# Patient Record
Sex: MALE | Race: WHITE | NOT HISPANIC OR LATINO | Employment: FULL TIME | URBAN - METROPOLITAN AREA
[De-identification: names, ages, dates, MRNs, and addresses within clinical notes are randomized per-mention and may not be internally consistent; named-entity substitution may affect disease eponyms.]

---

## 2019-09-17 ENCOUNTER — OFFICE VISIT (OUTPATIENT)
Dept: URGENT CARE | Facility: PHYSICIAN GROUP | Age: 24
End: 2019-09-17

## 2019-09-17 VITALS
HEART RATE: 81 BPM | OXYGEN SATURATION: 97 % | TEMPERATURE: 99.1 F | WEIGHT: 135 LBS | RESPIRATION RATE: 20 BRPM | SYSTOLIC BLOOD PRESSURE: 114 MMHG | DIASTOLIC BLOOD PRESSURE: 78 MMHG

## 2019-09-17 DIAGNOSIS — L08.9 SOFT TISSUE INFECTION: ICD-10-CM

## 2019-09-17 PROCEDURE — 99203 OFFICE O/P NEW LOW 30 MIN: CPT | Performed by: FAMILY MEDICINE

## 2019-09-17 RX ORDER — SULFAMETHOXAZOLE AND TRIMETHOPRIM 800; 160 MG/1; MG/1
1 TABLET ORAL EVERY 12 HOURS
Qty: 14 TAB | Refills: 0 | Status: SHIPPED | OUTPATIENT
Start: 2019-09-17 | End: 2019-09-24

## 2019-09-17 RX ORDER — AMOXICILLIN AND CLAVULANATE POTASSIUM 875; 125 MG/1; MG/1
1 TABLET, FILM COATED ORAL 2 TIMES DAILY
Qty: 14 TAB | Refills: 0 | Status: SHIPPED | OUTPATIENT
Start: 2019-09-17 | End: 2019-09-24

## 2019-09-18 NOTE — PROGRESS NOTES
Subjective:      Booker Mascorro is a 24 y.o. male who presents with Facial Swelling (left side of face swollen started yesterday morning getting bigger)            Onset this morning facial swelling, pain, redness, and warmth.  No drainage.  Pain 6/10 with direct contact.  Progressively worse.  No itching  No dental pain. No trismus.   No fever/chills.   No vesicles  No eye involvement  Minimal relief with OTC medications.  No other aggravating or alleviating factors.      Review of Systems   Constitutional: Negative for malaise/fatigue and weight loss.   Eyes: Negative for discharge and redness.   Gastrointestinal: Negative for nausea and vomiting.   Musculoskeletal: Negative for joint pain and myalgias.   Skin: Negative for itching and rash.   .  Medications, Allergies, and current problem list reviewed today in Epic         Objective:     /78 (BP Location: Right arm, Patient Position: Sitting, BP Cuff Size: Adult)   Pulse 81   Temp 37.3 °C (99.1 °F) (Temporal)   Resp 20   Wt 61.2 kg (135 lb)   SpO2 97%      Physical Exam   Constitutional: He is oriented to person, place, and time. He appears well-developed and well-nourished. No distress.   HENT:   Head: Normocephalic and atraumatic.       Right Ear: External ear normal.   Left Ear: External ear normal.   Eyes: Pupils are equal, round, and reactive to light. Conjunctivae and EOM are normal.   Cardiovascular: Normal rate, regular rhythm and normal heart sounds.   No murmur heard.  Pulmonary/Chest: Effort normal and breath sounds normal. He has no wheezes.   Neurological: He is alert and oriented to person, place, and time.   Skin: Skin is warm and dry. No rash noted.               Assessment/Plan:     1. Soft tissue infection  amoxicillin-clavulanate (AUGMENTIN) 875-125 MG Tab    sulfamethoxazole-trimethoprim (BACTRIM DS) 800-160 MG tablet    DISCONTINUED: cefTRIAXone (ROCEPHIN) 1 g, lidocaine (XYLOCAINE) 1 % 3.6 mL for IM use     Differential  diagnosis, natural history, supportive care, and indications for immediate follow-up discussed at length.     Nothing to I&D at this point.  Recommend returning to clinic with obvious abscess formation.

## 2019-09-19 ENCOUNTER — OFFICE VISIT (OUTPATIENT)
Dept: URGENT CARE | Facility: PHYSICIAN GROUP | Age: 24
End: 2019-09-19

## 2019-09-19 VITALS
RESPIRATION RATE: 16 BRPM | SYSTOLIC BLOOD PRESSURE: 114 MMHG | OXYGEN SATURATION: 95 % | DIASTOLIC BLOOD PRESSURE: 82 MMHG | HEIGHT: 70 IN | HEART RATE: 133 BPM | TEMPERATURE: 98.7 F | BODY MASS INDEX: 19.33 KG/M2 | WEIGHT: 135 LBS

## 2019-09-19 DIAGNOSIS — L02.91 ABSCESS: ICD-10-CM

## 2019-09-19 PROCEDURE — 99213 OFFICE O/P EST LOW 20 MIN: CPT | Performed by: FAMILY MEDICINE

## 2019-09-19 ASSESSMENT — ENCOUNTER SYMPTOMS
FACIAL SWELLING: 1
CHILLS: 0
NUMBNESS: 0
FEVER: 0

## 2019-09-19 NOTE — PROGRESS NOTES
"Subjective:   Booker Mascorro is a 24 y.o. male who presents for Facial Swelling (Left side of face, was seen tuesday 09/17/19, given antibiotics, pt states his condition is worsening)     Facial Swelling   This is a new problem. The current episode started in the past 7 days. The problem occurs constantly. The problem has been gradually improving. Pertinent negatives include no chills, fever or numbness.     Review of Systems   Constitutional: Negative for chills and fever.   HENT: Positive for facial swelling.    Neurological: Negative for numbness.      Objective:   /82 (BP Location: Left arm, Patient Position: Sitting, BP Cuff Size: Adult)   Pulse (!) 133   Temp 37.1 °C (98.7 °F) (Temporal)   Resp 16   Ht 1.778 m (5' 10\")   Wt 61.2 kg (135 lb)   SpO2 95%   BMI 19.37 kg/m²   Physical Exam   Constitutional: He is oriented to person, place, and time. He appears well-developed and well-nourished. No distress.   HENT:   Head: Normocephalic and atraumatic.       Eyes: Pupils are equal, round, and reactive to light. Conjunctivae and EOM are normal.   Cardiovascular: Normal rate and regular rhythm.   No murmur heard.  Pulmonary/Chest: Effort normal and breath sounds normal. No respiratory distress.   Abdominal: Soft. He exhibits no distension. There is no tenderness.   Neurological: He is alert and oriented to person, place, and time. He has normal reflexes. No sensory deficit.   Skin: Skin is warm and dry.   Psychiatric: He has a normal mood and affect.        Assessment/Plan:   1. Abscess  Continue current antibiotics. Use over-the-counter pain reliever, such as acetaminophen (Tylenol), ibuprofen (Advil, Motrin) or naproxen (Aleve) as needed; follow package directions for dosing.   Differential diagnosis, natural history, supportive care, and indications for immediate follow-up discussed.    "

## 2019-09-25 ASSESSMENT — ENCOUNTER SYMPTOMS
NAUSEA: 0
EYE DISCHARGE: 0
EYE REDNESS: 0
MYALGIAS: 0
WEIGHT LOSS: 0
VOMITING: 0

## 2020-07-23 ENCOUNTER — OFFICE VISIT (OUTPATIENT)
Dept: URGENT CARE | Facility: PHYSICIAN GROUP | Age: 25
End: 2020-07-23

## 2020-07-23 VITALS
TEMPERATURE: 99.1 F | DIASTOLIC BLOOD PRESSURE: 66 MMHG | SYSTOLIC BLOOD PRESSURE: 114 MMHG | HEART RATE: 94 BPM | OXYGEN SATURATION: 97 % | BODY MASS INDEX: 20.61 KG/M2 | RESPIRATION RATE: 16 BRPM | WEIGHT: 136 LBS | HEIGHT: 68 IN

## 2020-07-23 DIAGNOSIS — L03.113 CELLULITIS OF RIGHT ELBOW: ICD-10-CM

## 2020-07-23 PROCEDURE — 99214 OFFICE O/P EST MOD 30 MIN: CPT | Performed by: FAMILY MEDICINE

## 2020-07-23 RX ORDER — SULFAMETHOXAZOLE AND TRIMETHOPRIM 800; 160 MG/1; MG/1
TABLET ORAL
Qty: 20 TAB | Refills: 0 | Status: SHIPPED | OUTPATIENT
Start: 2020-07-23 | End: 2020-10-06

## 2020-07-23 RX ORDER — AMOXICILLIN AND CLAVULANATE POTASSIUM 875; 125 MG/1; MG/1
TABLET, FILM COATED ORAL
Qty: 20 TAB | Refills: 0 | Status: SHIPPED | OUTPATIENT
Start: 2020-07-23 | End: 2020-10-06

## 2020-07-24 NOTE — PROGRESS NOTES
Chief Complaint:    Chief Complaint   Patient presents with   • Elbow Problem     R. Elbow infection       History of Present Illness:    This is a new problem. For 2-3 days, he has redness, pain, swelling, and pustular lesions on right elbow extensor region. There is redness, swelling, and pain into right forearm. No injury or trauma. He was able to squeeze a little bit of pus out of one site. He was treated for facial infection with Augmentin + Bactrim Sept 2019 which he reports worked and were tolerated.      Review of Systems:    Constitutional: Negative for fever, chills, and diaphoresis.   Eyes: Negative for change in vision, photophobia, pain, redness, and discharge.  ENT: Negative for ear pain, ear discharge, hearing loss, tinnitus, nasal congestion, nosebleeds, and sore throat.    Respiratory: Negative for cough, hemoptysis, sputum production, shortness of breath, wheezing, and stridor.    Cardiovascular: Negative for chest pain, palpitations, orthopnea, claudication, leg swelling, and PND.   Gastrointestinal: Negative for abdominal pain, nausea, vomiting, diarrhea, constipation, blood in stool, and melena.   Genitourinary: Negative for dysuria, urinary urgency, urinary frequency, hematuria, and flank pain.   Musculoskeletal: See HPI.  Skin: See HPI.  Neurological: Negative for dizziness, tingling, tremors, sensory change, speech change, focal weakness, seizures, loss of consciousness, and headaches.   Endo: Negative for polydipsia.   Heme: Does not bruise/bleed easily.   Psychiatric/Behavioral: Negative for depression, suicidal ideas, hallucinations, memory loss and substance abuse. The patient is not nervous/anxious and does not have insomnia.        Past Medical History:    History reviewed. No pertinent past medical history.    Past Surgical History:    History reviewed. No pertinent surgical history.    Social History:    Social History     Socioeconomic History   • Marital status: Single     Spouse  "name: Not on file   • Number of children: Not on file   • Years of education: Not on file   • Highest education level: Not on file   Occupational History   • Not on file   Social Needs   • Financial resource strain: Not on file   • Food insecurity     Worry: Not on file     Inability: Not on file   • Transportation needs     Medical: Not on file     Non-medical: Not on file   Tobacco Use   • Smoking status: Never Smoker   • Smokeless tobacco: Current User   Substance and Sexual Activity   • Alcohol use: Not on file   • Drug use: Not on file   • Sexual activity: Not on file   Lifestyle   • Physical activity     Days per week: Not on file     Minutes per session: Not on file   • Stress: Not on file   Relationships   • Social connections     Talks on phone: Not on file     Gets together: Not on file     Attends Sikh service: Not on file     Active member of club or organization: Not on file     Attends meetings of clubs or organizations: Not on file     Relationship status: Not on file   • Intimate partner violence     Fear of current or ex partner: Not on file     Emotionally abused: Not on file     Physically abused: Not on file     Forced sexual activity: Not on file   Other Topics Concern   • Not on file   Social History Narrative   • Not on file     Family History:    History reviewed. No pertinent family history.    Medications:    No current outpatient medications on file prior to visit.     No current facility-administered medications on file prior to visit.      Allergies:    No Known Allergies      Vitals:    Vitals:    07/23/20 1744   BP: 114/66   BP Location: Left arm   Patient Position: Sitting   BP Cuff Size: Adult   Pulse: 94   Resp: 16   Temp: 37.3 °C (99.1 °F)   TempSrc: Temporal   SpO2: 97%   Weight: 61.7 kg (136 lb)   Height: 1.727 m (5' 8\")       Physical Exam:    Constitutional: Vital signs reviewed. Appears well-developed and well-nourished. No acute distress.   Eyes: Sclera white, " conjunctivae clear.  ENT: External ears normal. Hearing normal.  Cardiovascular: Peripheral pulses 2+.   Pulmonary/Chest: Respirations non-labored.  Musculoskeletal: Normal gait. Normal range of motion. No muscular atrophy or weakness.  Neurological: Alert and oriented to person, place, and time. Muscle tone normal. Coordination normal. Light touch and sensation normal.   Skin: Right elbow extensor surface and right forearm to mid forearm, there is erythema, swelling, and tenderness to palpation. There are multiple pustular lesions in right elbow extensor region with one area that is scabbed from him squeezing the area.  Psychiatric: Normal mood and affect. Behavior is normal. Judgment and thought content normal.       Assessment / Plan:    1. Cellulitis of right elbow  - sulfamethoxazole-trimethoprim (BACTRIM DS) 800-160 MG tablet; 1 TAB BY MOUTH TWICE A DAY X 10 DAYS.  Dispense: 20 Tab; Refill: 0  - amoxicillin-clavulanate (AUGMENTIN) 875-125 MG Tab; 1 TAB BY MOUTH TWICE A DAY X 10 DAYS. TAKE WITH FOOD.  Dispense: 20 Tab; Refill: 0      Discussed with him DDX, management options, and risks, benefits, and alternatives to treatment plan agreed upon.    I gently squeezed the pustular lesions to decompress them, getting some pus out.    Agreeable to medications prescribed.    Discussed expected course of duration, time for improvement, and to seek follow-up in Emergency Room, urgent care, or with PCP if getting worse at any time or not improving within expected time frame.

## 2020-10-06 ENCOUNTER — OFFICE VISIT (OUTPATIENT)
Dept: URGENT CARE | Facility: PHYSICIAN GROUP | Age: 25
End: 2020-10-06

## 2020-10-06 VITALS
DIASTOLIC BLOOD PRESSURE: 100 MMHG | RESPIRATION RATE: 20 BRPM | SYSTOLIC BLOOD PRESSURE: 128 MMHG | TEMPERATURE: 99.9 F | HEART RATE: 132 BPM | OXYGEN SATURATION: 99 %

## 2020-10-06 DIAGNOSIS — L03.115 CELLULITIS OF RIGHT ANTERIOR LOWER LEG: ICD-10-CM

## 2020-10-06 PROCEDURE — 99214 OFFICE O/P EST MOD 30 MIN: CPT | Performed by: PHYSICIAN ASSISTANT

## 2020-10-06 RX ORDER — SULFAMETHOXAZOLE AND TRIMETHOPRIM 800; 160 MG/1; MG/1
1 TABLET ORAL 2 TIMES DAILY
Qty: 14 TAB | Refills: 0 | Status: SHIPPED | OUTPATIENT
Start: 2020-10-06 | End: 2020-10-13

## 2020-10-09 ASSESSMENT — ENCOUNTER SYMPTOMS
VOMITING: 0
SHORTNESS OF BREATH: 0
SENSORY CHANGE: 0
TINGLING: 0
SORE THROAT: 0
FEVER: 0
PALPITATIONS: 0
CHILLS: 0
NAUSEA: 0
BLURRED VISION: 0
LEG SWELLING: 1

## 2020-10-09 NOTE — PROGRESS NOTES
Subjective:      Booker Mascorro is a 25 y.o. male who presents with Leg Swelling (possible staph right leg pain)    HPI:  This is a new problem. Booker Mascorro is a 25 y.o. male who presents today for evaluation of infection of right lower leg.  Patient states that 4 to 5 days ago he started to notice a wound on the front of his right shin.  He denies any injury.  He states that 2 days ago he banged the area on the toilet seat and it expressed some blood and some purulent discharge.  He states that since that time he has noticed increased redness extending all the way to his ankle, warmth, swelling of his right lower leg, and pain.  He states that even hurts to walk.  He has not noticed any fever/chills, streaking from the wound, or numbness/tingling.  He has been keeping the wound clean and covered but is not taking any other medications for his symptoms.  Patient states that he has had a few other bouts of cellulitis on his face and his elbow in the past few years which usually respond well to oral antibiotics.      Review of Systems   Constitutional: Negative for chills and fever.   HENT: Negative for sore throat.    Eyes: Negative for blurred vision.   Respiratory: Negative for shortness of breath.    Cardiovascular: Negative for chest pain and palpitations.   Gastrointestinal: Negative for nausea and vomiting.   Musculoskeletal: Negative for joint pain.   Skin:        Infection of right lower leg with redness, warmth, and swelling   Neurological: Negative for tingling and sensory change.       PMH:  has no past medical history of Meningitis, Migraine, Thyroid disease, Ulcer, or Urinary tract infection, site not specified.  MEDS:   Current Outpatient Medications:   •  sulfamethoxazole-trimethoprim (BACTRIM DS) 800-160 MG tablet, Take 1 Tab by mouth 2 times a day for 7 days., Disp: 14 Tab, Rfl: 0  ALLERGIES: No Known Allergies  SURGHX: No past surgical history on file.  SOCHX:  reports that he has  never smoked. He uses smokeless tobacco.  FH: Family history was reviewed, no pertinent findings to report     Objective:     /100 (BP Location: Right arm, Patient Position: Sitting, BP Cuff Size: Adult)   Pulse (!) 132   Temp 37.7 °C (99.9 °F) (Temporal)   Resp 20   SpO2 99%      Physical Exam  Constitutional:       Appearance: He is well-developed.   HENT:      Head: Normocephalic and atraumatic.      Right Ear: External ear normal.      Left Ear: External ear normal.   Eyes:      Conjunctiva/sclera: Conjunctivae normal.      Pupils: Pupils are equal, round, and reactive to light.   Cardiovascular:      Rate and Rhythm: Regular rhythm. Tachycardia present.      Heart sounds: Normal heart sounds. No murmur.   Pulmonary:      Effort: Pulmonary effort is normal.      Breath sounds: Normal breath sounds. No wheezing.   Skin:     General: Skin is warm and dry.      Capillary Refill: Capillary refill takes less than 2 seconds.             Comments: Right proximal/anterior shin exhibits an open wound approximately 1.5 cm in diameter with mild clear/yellow discharge noted.  There is surrounding erythema.  The erythema extends all the way to the ankle.  There is also diffuse soft tissue swelling noted of the right lower extremity with warmth.  The entire right lower extremity is tender to palpation.  Antalgic gait.  There is no streaking from the wound.  No fluctuance.   Neurological:      Mental Status: He is alert and oriented to person, place, and time.   Psychiatric:         Behavior: Behavior normal.         Judgment: Judgment normal.            Assessment/Plan:     1. Cellulitis of right anterior lower leg  - cefTRIAXone (ROCEPHIN) 1 g, lidocaine (XYLOCAINE) 1 % 3.6 mL for IM use  - sulfamethoxazole-trimethoprim (BACTRIM DS) 800-160 MG tablet; Take 1 Tab by mouth 2 times a day for 7 days.  Dispense: 14 Tab; Refill: 0  *Patient is not ill-appearing but he is tachycardic with mildly elevated temp. the wound  was cleaned and a dry/sterile dressing was applied.  We will treat with ceftriaxone and a 7-day course of Bactrim.  Discussed that if symptoms do not improve after 24 to 48 hours or if they get worse in any way (he develops fever, increased swelling/pain, the redness extends up towards his knee, or there is any streaking from the wound) and he should go directly to the emergency department for further evaluation and management.  Also recommended that patient keep the extremity elevated as much as possible and take OTC analgesics as needed.              Differential Diagnosis, natural history, and supportive care discussed. Return to the Urgent Care or follow up with your PCP if symptoms fail to resolve, or for any new or worsening symptoms. Emergency room precautions discussed. Patient and/or family appears understanding of information.